# Patient Record
Sex: MALE | Race: BLACK OR AFRICAN AMERICAN | Employment: FULL TIME | URBAN - METROPOLITAN AREA
[De-identification: names, ages, dates, MRNs, and addresses within clinical notes are randomized per-mention and may not be internally consistent; named-entity substitution may affect disease eponyms.]

---

## 2020-02-13 ENCOUNTER — OFFICE VISIT (OUTPATIENT)
Dept: INTERNAL MEDICINE CLINIC | Age: 30
End: 2020-02-13
Payer: COMMERCIAL

## 2020-02-13 VITALS
HEART RATE: 87 BPM | WEIGHT: 183 LBS | TEMPERATURE: 98.5 F | BODY MASS INDEX: 27.74 KG/M2 | DIASTOLIC BLOOD PRESSURE: 76 MMHG | HEIGHT: 68 IN | OXYGEN SATURATION: 99 % | SYSTOLIC BLOOD PRESSURE: 120 MMHG

## 2020-02-13 DIAGNOSIS — Z13.9 SCREENING FOR CONDITION: ICD-10-CM

## 2020-02-13 LAB
TSH REFLEX: 2.28 UIU/ML (ref 0.27–4.2)
VITAMIN D 25-HYDROXY: 9.4 NG/ML

## 2020-02-13 PROCEDURE — 99203 OFFICE O/P NEW LOW 30 MIN: CPT | Performed by: INTERNAL MEDICINE

## 2020-02-13 RX ORDER — LORATADINE 10 MG/1
10 TABLET ORAL DAILY PRN
Qty: 30 TABLET | Refills: 3 | Status: SHIPPED | OUTPATIENT
Start: 2020-02-13 | End: 2020-03-14

## 2020-02-13 ASSESSMENT — PATIENT HEALTH QUESTIONNAIRE - PHQ9
2. FEELING DOWN, DEPRESSED OR HOPELESS: 0
1. LITTLE INTEREST OR PLEASURE IN DOING THINGS: 0
SUM OF ALL RESPONSES TO PHQ QUESTIONS 1-9: 0
SUM OF ALL RESPONSES TO PHQ QUESTIONS 1-9: 0
SUM OF ALL RESPONSES TO PHQ9 QUESTIONS 1 & 2: 0

## 2020-02-13 NOTE — PROGRESS NOTES
SUBJECTIVE:  Meenakshi Davalos is a 30 y.o. male [facility] Complaint   Patient presents with    New Patient      PT HERE TO INITIATE CARE    PREVIOUS PCP: NONE      ALLERGIC RHINITIS - OCC NASAL CONGESTION, OCC POSTNASAL DRAINAGE , NO SINUS PRESSURE, OCC HA, OCC SNEEZING, + OCC WATERY ITCHY EYES. RT TINNITUS - STATES INTERMITTENT  FOR YEARS. DENIES HEARING LOSS. + EXPOSURE TO LOUD NOISES. STATES PRIOR HEARING EVAL - PT IN THE RESERVE  PLAN TO DONATE KIDNEY TO BROTHER - FOLLOWING WITH TRANSPLANT NEPHROLOGY  SCREENING LABS D/W PT    DENIES CP, No SOB, No PALPITATIONS, No COUGH  No ABD PAIN, No N/V, No DIARRHEA, No CONSTIPATION, No MELENA, No HEMATOCHEZIA. No DYSURIA, No FREQ, No URGENCY, No HEMATURIA    PMH: REVIEWED AND UPDATED TODAY    PSH: REVIEWED AND UPDATED TODAY    SOCIAL HX: REVIEWED AND UPDATED TODAY. PT IN THE RESERVE    FAMILY HX: REVIEWED AND UPDATED TODAY    ALLERGY:  Cat hair extract and Molds & smuts    MEDS: REVIEWED  Prior to Visit Medications    Not on File - NONE NOW     IMMUNIZATION: INFLUENZA 9/19, ? PNEUMONIA VACCINE, ? TETANUS    ROS: COMPREHENSIVE ROS AS IN HX, REST -VE  History obtained from chart review and the patient    OBJECTIVE:   NURSING NOTE AND VITALS REVIEWED  /72 (Site: Left Upper Arm, Position: Sitting, Cuff Size: Medium Adult)   Pulse 87   Temp 98.5 °F (36.9 °C) (Oral)   Ht 5' 7.5\" (1.715 m)   Wt 183 lb (83 kg)   SpO2 99%   BMI 28.24 kg/m²     NO ACUTE DISTRESS. PLEASANT    REPEAT BP: 120/76 (LT), NO ORTHOSTASIS     Body mass index is 28.24 kg/m². HEENT: NO PALLOR, ANICTERIC, PERRLA, EOMI, NO CONJUNCTIVAL ERYTHEMA,                 NO SINUS TENDERNESS, NO CERUMEN IMPACTION, NO TM / NO PHARYNGEAL ERYTHEMA  NECK:  SUPPLE, TRACHEA MIDLINE, NT, NO JVD, NO CB, NO LA, NO TM, NO STIFFNESS  CHEST: RESPY EFFORT NL, GOOD AE, NO W/R/C  HEART: S1S2+ REG, NO M/G/R  ABD: SOFT, NT, NO HSM, BS+  EXT: NO EDEMA, NT, PULSES +.  DUARTE'S -VE  NEURO: ALERT AND ORIENTED X 3, NO MENINGEAL SIGNS, NO TREMORS, NL GAIT, NO FOCAL DEFICITS  PSYCH: FAIRLY GOOD AFFECT  BACK: NT, NO ROM, NO CVA TENDERNESS    PREVIOUS LABS - OUTSIDE LABS REVIEWED AND D/W PT       ASSESSMENT / PLAN:     Diagnosis Orders   1. Other allergic rhinitis  COUNSELLED. ADVISED CLARITIN 10 MG DAILY / PRN. MONITOR. MAKE CHANGES AS NEEDED. 2. Tinnitus of right ear  COUNSELLED. EXAM UNREMARKABLE. AVOID LOUD NOISES  F/U AUDIOLOGY  MAKE CHANGES AS NEEDED. 3. Transplant donor evaluation  COUNSELLED. PT ENCOURAGED. F/U TRANSPLANT NEPHROLOGY  MAKE CHANGES AS NEEDED. 4. Screening for condition  COUNSELLED. LABS TO EVAL - OK WITH PT  MAKE CHANGES AS NEEDED. OBTAIN IMMUNIZATION RECORDS      Amus received counseling on the following healthy behaviors: nutrition, exercise and medication adherence    Patient given educational materials on Nutrition and Exercise    I have instructed Amus to complete a self tracking handout on Weights and instructed them to bring it with them to his next appointment. Discussed use, benefit, and side effects of prescribed medications. Barriers to medication compliance addressed. All patient questions answered. Pt voiced understanding.          MEDICATION SIDE EFFECTS D/W PATIENT    PT STABLE AT TIME OF D/C.    RETURN TO CLINIC WITHIN 4 MONTHS / PRN    FOLLOW UP FOR LABS

## 2020-07-07 ENCOUNTER — TELEPHONE (OUTPATIENT)
Dept: INTERNAL MEDICINE CLINIC | Age: 30
End: 2020-07-07

## 2020-07-07 ENCOUNTER — NURSE TRIAGE (OUTPATIENT)
Dept: OTHER | Facility: CLINIC | Age: 30
End: 2020-07-07

## 2020-07-07 NOTE — TELEPHONE ENCOUNTER
Reason for Disposition   [1] No prior tetanus shots (or is not fully vaccinated) AND [2] any wound (e.g., cut, scrape)    Answer Assessment - Initial Assessment Questions  1. MECHANISM: \"How did the injury happen? \" (e.g., twisting injury, direct blow)       *No Answer*  2. ONSET: \"When did the injury happen? \" (Minutes or hours ago)       *No Answer*  3. LOCATION: \"Where is the injury located? \"       Middle of ball of the foot  4. APPEARANCE of INJURY: \"What does the injury look like? \"       *No Answer*  5. WEIGHT-BEARING: \"Can you put weight on that foot? \" \"Can you walk (four steps or more)? \"        *No Answer*  6. SIZE: For cuts, bruises, or swelling, ask: \"How large is it? \" (e.g., inches or centimeters;  entire joint)       *No Answer*  7. PAIN: \"Is there pain? \" If so, ask: \"How bad is the pain? \"    (e.g., Scale 1-10; or mild, moderate, severe)      *No Answer*  8. TETANUS: For any breaks in the skin, ask: \"When was the last tetanus booster? \"      10 years ago  9. OTHER SYMPTOMS: \"Do you have any other symptoms? \"       *No Answer*  10. PREGNANCY: \"Is there any chance you are pregnant? \" \"When was your last menstrual period? \"        *No Answer*    Protocols used: FOOT AND ANKLE INJURY-ADULT-

## 2021-03-13 ENCOUNTER — HOSPITAL ENCOUNTER (EMERGENCY)
Age: 31
Discharge: HOME | End: 2021-03-13
Payer: COMMERCIAL

## 2021-03-13 VITALS
HEIGHT: 68 IN | TEMPERATURE: 99.4 F | DIASTOLIC BLOOD PRESSURE: 80 MMHG | WEIGHT: 189 LBS | BODY MASS INDEX: 28.64 KG/M2 | SYSTOLIC BLOOD PRESSURE: 130 MMHG | RESPIRATION RATE: 16 BRPM | OXYGEN SATURATION: 99 % | HEART RATE: 88 BPM

## 2021-03-13 DIAGNOSIS — R11.2 NAUSEA VOMITING AND DIARRHEA: Primary | ICD-10-CM

## 2021-03-13 DIAGNOSIS — R19.7 NAUSEA VOMITING AND DIARRHEA: Primary | ICD-10-CM

## 2021-03-13 PROCEDURE — 96374 THER/PROPH/DIAG INJ IV PUSH: CPT

## 2021-03-13 PROCEDURE — 96376 TX/PRO/DX INJ SAME DRUG ADON: CPT

## 2021-03-13 PROCEDURE — 99284 EMERGENCY DEPT VISIT MOD MDM: CPT

## 2021-03-13 PROCEDURE — 2580000003 HC RX 258: Performed by: PHYSICIAN ASSISTANT

## 2021-03-13 PROCEDURE — 6360000002 HC RX W HCPCS: Performed by: PHYSICIAN ASSISTANT

## 2021-03-13 PROCEDURE — 96375 TX/PRO/DX INJ NEW DRUG ADDON: CPT

## 2021-03-13 PROCEDURE — 2500000003 HC RX 250 WO HCPCS: Performed by: PHYSICIAN ASSISTANT

## 2021-03-13 RX ORDER — KETOROLAC TROMETHAMINE 30 MG/ML
30 INJECTION, SOLUTION INTRAMUSCULAR; INTRAVENOUS ONCE
Status: COMPLETED | OUTPATIENT
Start: 2021-03-13 | End: 2021-03-13

## 2021-03-13 RX ORDER — ONDANSETRON 2 MG/ML
4 INJECTION INTRAMUSCULAR; INTRAVENOUS ONCE
Status: COMPLETED | OUTPATIENT
Start: 2021-03-13 | End: 2021-03-13

## 2021-03-13 RX ORDER — 0.9 % SODIUM CHLORIDE 0.9 %
1000 INTRAVENOUS SOLUTION INTRAVENOUS ONCE
Status: COMPLETED | OUTPATIENT
Start: 2021-03-13 | End: 2021-03-13

## 2021-03-13 RX ORDER — ONDANSETRON 4 MG/1
4 TABLET, ORALLY DISINTEGRATING ORAL EVERY 8 HOURS PRN
Qty: 6 TABLET | Refills: 0 | Status: SHIPPED | OUTPATIENT
Start: 2021-03-13 | End: 2021-03-19

## 2021-03-13 RX ADMIN — SODIUM CHLORIDE 1000 ML: 9 INJECTION, SOLUTION INTRAVENOUS at 17:39

## 2021-03-13 RX ADMIN — KETOROLAC TROMETHAMINE 30 MG: 30 INJECTION, SOLUTION INTRAMUSCULAR at 17:39

## 2021-03-13 RX ADMIN — ONDANSETRON 4 MG: 2 INJECTION INTRAMUSCULAR; INTRAVENOUS at 18:37

## 2021-03-13 RX ADMIN — FAMOTIDINE 20 MG: 10 INJECTION, SOLUTION INTRAVENOUS at 18:36

## 2021-03-13 RX ADMIN — ONDANSETRON 4 MG: 2 INJECTION INTRAMUSCULAR; INTRAVENOUS at 17:39

## 2021-03-13 ASSESSMENT — ENCOUNTER SYMPTOMS
COLOR CHANGE: 0
DIARRHEA: 1
ABDOMINAL DISTENTION: 0
NAUSEA: 1
BACK PAIN: 0
EYES NEGATIVE: 1
SHORTNESS OF BREATH: 0
ABDOMINAL PAIN: 1
VOMITING: 1

## 2021-03-13 ASSESSMENT — PAIN SCALES - GENERAL
PAINLEVEL_OUTOF10: 7
PAINLEVEL_OUTOF10: 7

## 2021-03-13 NOTE — ED NOTES
Pt c/o nausea.   No episodes of emesis or loose stools since arrival.       Fanny Mckeon RN  03/13/21 3187

## 2021-03-13 NOTE — ED NOTES
Pt resting on cot in position of comfort. Respirations regular. Airway intact. GCS 15. Pt holding conversation with this RN w/o difficulty.  0 s/s of distress. Awaiting further orders. Will continue to monitor.        Richardson Gottron, RN  03/13/21 9063

## 2021-03-13 NOTE — LETTER
[facility]  ED  [address] [address]  Phone: [phone]  Fax: [phone]               March 13, 2021    Patient: Brandon Chan   YOB: 1990   Date of Visit: 3/13/2021       To Whom It May Concern:    Brandon Chan was seen and treated in our emergency department on 3/13/2021. He may return to work on 3/15/2021.       Sincerely,           Bo Shi PA-C        Signature:__________________________________

## 2021-03-14 NOTE — ED PROVIDER NOTES
[facility]  ED  EMERGENCY DEPARTMENT ENCOUNTER        Pt Name: Jada Cooper  MRN: [id number]  [place] 1990  Date of evaluation: 3/13/2021  Provider: Karen Ho PA-C  PCP: No primary care provider on file. ED Attending: Maria De Jesus Go MD      This patient was not seen by the attending provider     History provided by the patient    CHIEF COMPLAINT:     Chief Complaint   Patient presents with    Emesis     since earlier this morning    Diarrhea     since earlier this momring       HISTORY OF PRESENT ILLNESS:      Jada Cooper is a 35 y.o. male who arrives to the ED by private vehicle. Patient here reporting nausea, vomiting and diarrhea that started around 6 AM.  He states he has vomited about 5 times and had several bouts of diarrhea. He states his brother was also recently sick with a GI bug. Patient describes generalized abdominal discomfort and a \"gurgling\" in his abdomen. By the time he arrives to the ED shortly after 5 PM he states the last time he vomited was at 2 PM.  He has not had anything to eat or drink since then and has not therefore vomited. No identifiable exacerbating or alleviating factors to symptoms. No prior abdominal or pelvic surgeries. Nursing Notes were reviewed     REVIEW OF SYSTEMS:     Review of Systems   Constitutional: Positive for appetite change. Negative for chills and fever. HENT: Negative. Eyes: Negative. Respiratory: Negative for shortness of breath. Cardiovascular: Negative for chest pain. Gastrointestinal: Positive for abdominal pain, diarrhea, nausea and vomiting. Negative for abdominal distention. Genitourinary: Negative. Musculoskeletal: Negative for back pain and neck pain. Skin: Negative for color change. Neurological: Negative for dizziness and headaches. All other systems reviewed and are negative. Except as noted above in the ROS, all other systems were reviewed and negative.          PAST MEDICAL HISTORY: Weight   03/13/21 1722 03/13/21 1722 03/13/21 1722 03/13/21 1722 03/13/21 1722 03/13/21 1722 03/13/21 1720 03/13/21 1720   136/83 99.4 °F (37.4 °C) Oral 112 16 97 % 5' 8\" (1.727 m) 189 lb (85.7 kg)       Physical Exam    CONSTITUTIONAL: Awake and alert. Cooperative. Well-developed. Well-nourished. Non-toxic. No acute distress. HENT: Normocephalic. Atraumatic. External ears normal, without discharge. No nasal discharge. Oropharynx clear. Mucous membranes moist.  EYES: Conjunctiva non-injected. No scleral icterus. PERRL. EOM's grossly intact. NECK: Supple. Normal ROM. CARDIOVASCULAR: RRR. No Murmer. Intact distal pulses. PULMONARY/CHEST WALL: Effort normal. No tachypnea. Lungs clear to ausculation. ABDOMEN: Normal BS. Soft. Nondistended. No tenderness to palpate. No guarding. /ANORECTAL: Not assessed  MUSKULOSKELETAL: Normal ROM. No acute deformities. No edema. No tenderness to palpate. SKIN: Warm and dry. No rash. NEUROLOGICAL: Alert and oriented x 3. GCS 15. CN II-XII grossly intact. Strength is 5/5 in all extremities and sensation is intact. Normal gait.    PSYCHIATRIC: Normal affect        DIAGNOSTICRESULTS:     None    PROCEDURES:   N/A    CRITICAL CARE TIME:       None      CONSULTS:  None      EMERGENCY DEPARTMENT COURSE and DIFFERENTIAL DIAGNOSIS/MDM:   Vitals:    Vitals:    03/13/21 1720 03/13/21 1722 03/13/21 1825 03/13/21 1916   BP:  136/83  130/80   Pulse:  112 97 88   Resp:  16  16   Temp:  99.4 °F (37.4 °C)     TempSrc:  Oral     SpO2:  97% 99% 99%   Weight: 189 lb (85.7 kg)      Height: 5' 8\" (1.727 m)          Patient was given the following medications:  Medications   0.9 % sodium chloride bolus (0 mLs Intravenous Stopped 3/13/21 1840)   ondansetron (ZOFRAN) injection 4 mg (4 mg Intravenous Given 3/13/21 1739)   ketorolac (TORADOL) injection 30 mg (30 mg Intravenous Given 3/13/21 1739)   ondansetron (ZOFRAN) injection 4 mg (4 mg Intravenous Given 3/13/21 5567)   famotidine (PEPCID) injection 20 mg (20 mg Intravenous Given 3/13/21 7286)         I have evaluated this patient in the ED. Old records were reviewed. Patient here with nausea, vomiting and diarrhea since 6 AM.  He has not vomited since 2 PM.  Patient arrives mildly tachycardic with otherwise normal vital signs. Abdominal exam benign. Patient reports his brother was just sick with the same GI type illness. The patient was given 1 L normal saline IV with Zofran 4 mg IV and Toradol 30 mg IV. This was followed by a second dose of Zofran 4 mg IV and Pepcid 20 mg IV. Following this patient is feeling much better. Heart rate has normalized. Patient tolerating p.o. No indication for further work-up as I do believe the patient's symptoms are viral in etiology. Patient will be discharged with a prescription for Zofran ODT and will be provided with a work note at his request.  I estimate there is LOW risk for ACUTE APPENDICITIS, PYELONEPHRITIS, BOWEL OBSTRUCTION, CHOLECYSTITIS, DIVERTICULITIS, INCARCERATED HERNIA, PANCREATITIS, PERFORATED BOWEL or ULCER, thus I consider the discharge disposition reasonable. Also, there is no evidence or peritonitis, sepsis, or toxicity. Ayana Norman and I have discussed the diagnosis and risks, and we agree with discharging home to follow-up with their primary doctor. We also discussed returning to the Emergency Department immediately if new or worsening symptoms occur. We have discussed the symptoms which are most concerning (e.g., bloody stool, fever, changing or worsening pain, vomiting) that necessitate immediate return.        FINAL IMPRESSION:      1. Nausea vomiting and diarrhea          DISPOSITION/PLAN:   DISPOSITION Decision To Discharge      PATIENT REFERRED TO:  [facility]  ED  [address]  Go to   As needed      DISCHARGE MEDICATIONS:  Discharge Medication List as of 3/13/2021  7:00 PM      START taking these medications Details   ondansetron (ZOFRAN ODT) 4 MG disintegrating tablet Take 1 tablet by mouth every 8 hours as needed for Nausea or Vomiting, Disp-6 tablet, R-0Print                        (Please note thatportions of this note were completed with a voice recognition program.  Efforts were made to edit the dictations, but occasionally words are mis-transcribed.)    Pascual Erickson PA-C (electronicallysigned)              [place], [place]  03/13/21 1942

## 2021-03-14 NOTE — ED NOTES
Pt ok to d/c to home. Pt given d/c instructions. Pt verbalized understating including Rx and follow up care. Pt ambulated to lobby for ride home.  0 s/s of distress at time of d/c.          Juli Amor RN  03/13/21 6147

## 2021-07-18 ENCOUNTER — HOSPITAL ENCOUNTER (EMERGENCY)
Age: 31
Discharge: HOME | End: 2021-07-18
Payer: COMMERCIAL

## 2021-07-18 VITALS
WEIGHT: 195 LBS | HEART RATE: 57 BPM | HEIGHT: 68 IN | TEMPERATURE: 98.3 F | RESPIRATION RATE: 15 BRPM | BODY MASS INDEX: 29.55 KG/M2 | SYSTOLIC BLOOD PRESSURE: 123 MMHG | OXYGEN SATURATION: 99 % | DIASTOLIC BLOOD PRESSURE: 88 MMHG

## 2021-07-18 DIAGNOSIS — S39.012A STRAIN OF LUMBAR REGION, INITIAL ENCOUNTER: Primary | ICD-10-CM

## 2021-07-18 PROCEDURE — 99283 EMERGENCY DEPT VISIT LOW MDM: CPT

## 2021-07-18 RX ORDER — DICLOFENAC SODIUM 75 MG/1
75 TABLET, DELAYED RELEASE ORAL 2 TIMES DAILY
Qty: 14 TABLET | Refills: 0 | Status: SHIPPED | OUTPATIENT
Start: 2021-07-18 | End: 2021-07-25

## 2021-07-18 RX ORDER — CYCLOBENZAPRINE HCL 10 MG
10 TABLET ORAL 3 TIMES DAILY PRN
Qty: 21 TABLET | Refills: 0 | Status: SHIPPED | OUTPATIENT
Start: 2021-07-18 | End: 2021-07-28

## 2021-07-18 ASSESSMENT — PAIN SCALES - GENERAL: PAINLEVEL_OUTOF10: 7

## 2021-07-18 ASSESSMENT — PAIN DESCRIPTION - PAIN TYPE: TYPE: ACUTE PAIN

## 2021-07-18 ASSESSMENT — PAIN DESCRIPTION - LOCATION: LOCATION: COCCYX

## 2021-07-18 NOTE — ED PROVIDER NOTES
[address] [place] [address]  ED  EMERGENCY DEPARTMENT ENCOUNTER      This patient was not seen and evaluated by the attending physician. Pt Name: Jung Bland  MRN: [id number]  [place] 1990  Date of evaluation: 7/18/2021  Provider: AUSTIN Vanessa  PCP: No primary care provider on file. History provided by the patient    CHIEFCOMPLAINT:     Chief Complaint   Patient presents with    Tailbone Pain     1 1/2 years       HISTORY OF PRESENT ILLNESS:      Jung Bland is a 28 y.o. 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 No loss control of bowel or bladder, has had no tingling or numbness associated. Patient has not seen a spine specialist states that he did see a chiropractor once. He is here for further evaluation he is resting comfortably in the bed. LOCATION:low back  QUALITY:ache  SEVERITY:7  DURATION:chronic  MODIFYING FACTORS:worse with lifting    Nursing Notes were reviewed     REVIEW OF SYSTEMS:     Review of Systems  All systems, a total of 10, are reviewed and negative except for those that were just noted in history present illness.         PAST MEDICAL HISTORY:     Past Medical History:   Diagnosis Date    Allergic rhinitis     Tinnitus     RT EAR    Tinnitus of left ear     CORRECTION - RT EAR         SURGICAL HISTORY:      Past Surgical History:   Procedure Laterality Date    DENTAL SURGERY      WISDOM TOOTH EXTRACTION           CURRENT MEDICATIONS:       Previous Medications    No medications on file         ALLERGIES:    Cat hair extract and Molds & smuts    FAMILY HISTORY:       Family History   Problem Relation Age of Onset    High Blood Pressure Mother     Cancer Maternal Grandmother         ? TYPE    Cancer Maternal Grandfather         ? TYPE    Diabetes Paternal Grandmother           SOCIAL HISTORY:     Social History     Socioeconomic History    Marital status: Single     Spouse name: None    Number of children: None    Years of education: None    Highest education level: None   Occupational History    None   Tobacco Use    Smoking status: Never Smoker    Smokeless tobacco: Never Used   Substance and Sexual Activity    Alcohol use: No    Drug use: No    Sexual activity: Never   Other Topics Concern    None   Social History Narrative    None     Social Determinants of Health     Financial Resource Strain:     Difficulty of Paying Living Expenses:    Food Insecurity:     Worried About Running Out of Food in the Last Year:     Ran Out of Food in the Last Year:    Transportation Needs:     Lack of Transportation (Medical):  Lack of Transportation (Non-Medical):    Physical Activity:     Days of Exercise per Week:     Minutes of Exercise per Session:    Stress:     Feeling of Stress :    Social Connections:     Frequency of Communication with Friends and Family:     Frequency of Social Gatherings with Friends and Family:     Attends [employer] Services:     Active Member of Clubs or Organizations:     Attends Club or Organization Meetings:     Marital Status:    Intimate Partner Violence:     Fear of Current or Ex-Partner:     Emotionally Abused:     Physically Abused:     Sexually Abused:        SCREENINGS:             PHYSICAL EXAM:       ED Triage Vitals [07/18/21 1129]   BP Temp Temp Source Pulse Resp SpO2 Height Weight   133/75 98.3 °F (36.8 °C) Oral 55 16 98 % 5' 8\" (1.727 m) 195 lb (88.5 kg)       Physical Exam    CONSTITUTIONAL: Awake and alert. Cooperative. Well-developed. Well-nourished.    Vitals:    07/18/21 1129   BP: 133/75 Pulse: 55   Resp: 16   Temp: 98.3 °F (36.8 °C)   TempSrc: Oral   SpO2: 98%   Weight: 195 lb (88.5 kg)   Height: 5' 8\" (1.727 m)     HENT: Normocephalic. Atraumatic. External ears normal, without discharge. TMs clear bilaterally. Nonasal discharge. Oropharynx clear, no erythema. Mucous membranes moist.  EYES: Conjunctiva non-injected, nolid abnormalities noted. No scleral icterus. PERRL. EOM's grossly intact. Anterior chambers clear. NECK: Supple. Normal ROM. No meningismus. No thyroid tenderness or swelling noted. CARDIOVASCULAR: RRR. No Murmer. No carotid bruits. PULMONARY/CHEST WALL: Effort normal. No tachypnea. Lungs clear to ausculation. ABDOMEN: Normal BS. Soft. Nondistended. No tenderness to palpation. No guarding. No hernias noted. No splenomegaly. Back: Spine is midline. No ecchymosis. No crepituson palpation. No obvious subluxation of vertebral column. No saddle anesthesia or evidence of cauda equina. No significant tenderness noted  /ANORECTAL: Not assessed  MUSKULOSKELETAL: Normal ROM. No acute deformities. No edema. No tenderness to palpate. SKIN: Warm and dry. NEUROLOGICAL:  GCS 15. CN II-XII grossly intact. Strength is 5/5 in all extremities and sensation is intact. PSYCHIATRIC: Normal affect, normal insight and judgement. Alert and oriented x 3. DIAGNOSTIC RESULTS:     LABS:    No results found for this visit on 07/18/21. RADIOLOGY:  All x-ray studies are viewed/reviewed by me. Formal interpretations per the radiologist are as follows: No orders to display           EKG:  See EKG interpretation by an attending physician.       PROCEDURES:   N/A    CRITICAL CARE TIME:   N/A    CONSULTS:  None      EMERGENCY DEPARTMENT COURSE andDIFFERENTIAL DIAGNOSIS/MDM:   Vitals:    Vitals:    07/18/21 1129   BP: 133/75   Pulse: 55   Resp: 16   Temp: 98.3 °F (36.8 °C)   TempSrc: Oral   SpO2: 98%   Weight: 195 lb (88.5 kg)   Height: 5' 8\" (1.727 m)       Patient wasgiven the following medications:  Medications - No data to display      Patient was evaluated independently by myself with the attending physician available for consultation. Patient presented to the emergency room today with complaints of tailbone pain and low back pain. Patient states that he noticed it when he does lifting. Patient has had no trauma he had no neurologic deficits, he states that the pain is actually going on for a year and a half, I did not feel that imaging was necessary today given the lack of trauma, patient may benefit from an MRI as an outpatient, he was given referral to see orthopedics. He was given a work note for several days, patient states that he is going to start looking for a new job where he does not to do heavy lifting. He was discharged home with anti-inflammatory medication. Patient laboratory studies, radiographic imaging, and assessment were all discussed with the patient and/orpatient family. There was shared decision-making between myself as well as the patient and/or their surrogate and we are all in agreement with discharge home. There was an opportunity for questions and all questions were answered tothe best of my ability and to the satisfaction of the patient and/or patient family. FINAL IMPRESSION:      1.  Strain of lumbar region, initial encounter          DISPOSITION/PLAN:   DISPOSITION Decision To Discharge      PATIENT REFERRED TO:  Narinder Guajardo MD  [place] [address]  [phone]    Call   For follow up      DISCHARGE MEDICATIONS:  New Prescriptions    DICLOFENAC (VOLTAREN) 75 MG EC TABLET    Take 1 tablet by mouth 2 times daily for 7 days                  (Please note thatportions of this note were completed with a voice recognition program.  Efforts were made to edit the dictations, but occasionally words are mis-transcribed.)    AUSTIN Franklin CNP-C (electronicallysigned)        AUSTIN Franklin CNP  07/18/21 3880

## 2021-07-18 NOTE — ED NOTES
Discharge paperwork given to and reviewed with pt. Pt verbalized understanding and all questions answered. Pt encouraged to return if having worsening symptoms or new symptoms discussed in discharge paperwork. Pt to follow up with Ortho  Rx x 1 given and medications reviewed with pt. Pt in NAD, RR even and unlabored.  Pt off unit ambulatory      [facility]  07/18/21 1222